# Patient Record
Sex: MALE | Race: WHITE | URBAN - NONMETROPOLITAN AREA
[De-identification: names, ages, dates, MRNs, and addresses within clinical notes are randomized per-mention and may not be internally consistent; named-entity substitution may affect disease eponyms.]

---

## 2022-08-25 LAB — SARS-COV-2, PCR: NOT DETECTED

## 2024-03-21 ENCOUNTER — NURSE ONLY (OUTPATIENT)
Age: 59
End: 2024-03-21

## 2024-03-21 LAB
ALBUMIN SERPL BCG-MCNC: 4.3 G/DL (ref 3.5–5.1)
ALP SERPL-CCNC: 58 U/L (ref 38–126)
ALT SERPL W/O P-5'-P-CCNC: 55 U/L (ref 11–66)
ANION GAP SERPL CALC-SCNC: 14 MEQ/L (ref 8–16)
AST SERPL-CCNC: 34 U/L (ref 5–40)
BILIRUB SERPL-MCNC: 0.8 MG/DL (ref 0.3–1.2)
BUN SERPL-MCNC: 20 MG/DL (ref 7–22)
CALCIUM SERPL-MCNC: 9.5 MG/DL (ref 8.5–10.5)
CHLORIDE SERPL-SCNC: 101 MEQ/L (ref 98–111)
CHOLEST SERPL-MCNC: 156 MG/DL (ref 100–199)
CO2 SERPL-SCNC: 24 MEQ/L (ref 23–33)
CREAT SERPL-MCNC: 1.1 MG/DL (ref 0.4–1.2)
GFR SERPL CREATININE-BSD FRML MDRD: > 60 ML/MIN/1.73M2
GLUCOSE SERPL-MCNC: 109 MG/DL (ref 70–108)
HDLC SERPL-MCNC: 44 MG/DL
LDLC SERPL CALC-MCNC: 83 MG/DL
POTASSIUM SERPL-SCNC: 4 MEQ/L (ref 3.5–5.2)
PROT SERPL-MCNC: 7.3 G/DL (ref 6.1–8)
SODIUM SERPL-SCNC: 139 MEQ/L (ref 135–145)
TRIGL SERPL-MCNC: 145 MG/DL (ref 0–199)

## 2024-03-22 LAB
DEPRECATED MEAN GLUCOSE BLD GHB EST-ACNC: 114 MG/DL (ref 70–126)
HBA1C MFR BLD HPLC: 5.8 % (ref 4.4–6.4)
PSA SERPL-MCNC: 5.88 NG/ML (ref 0–1)

## 2025-03-07 ENCOUNTER — LAB (OUTPATIENT)
Age: 60
End: 2025-03-07

## 2025-03-07 LAB
ALBUMIN SERPL BCG-MCNC: 4 G/DL (ref 3.4–4.9)
ALP SERPL-CCNC: 60 U/L (ref 40–129)
ALT SERPL W/O P-5'-P-CCNC: 55 U/L (ref 10–50)
ANION GAP SERPL CALC-SCNC: 14 MEQ/L (ref 8–16)
AST SERPL-CCNC: 38 U/L (ref 10–50)
BILIRUB SERPL-MCNC: 0.5 MG/DL (ref 0.3–1.2)
BUN SERPL-MCNC: 15 MG/DL (ref 8–23)
CALCIUM SERPL-MCNC: 9.2 MG/DL (ref 8.8–10.2)
CHLORIDE SERPL-SCNC: 102 MEQ/L (ref 98–111)
CHOLEST SERPL-MCNC: 166 MG/DL (ref 100–199)
CO2 SERPL-SCNC: 23 MEQ/L (ref 22–29)
CREAT SERPL-MCNC: 0.9 MG/DL (ref 0.7–1.2)
DEPRECATED MEAN GLUCOSE BLD GHB EST-ACNC: 120 MG/DL (ref 70–126)
GFR SERPL CREATININE-BSD FRML MDRD: > 90 ML/MIN/1.73M2
GLUCOSE SERPL-MCNC: 110 MG/DL (ref 74–109)
HBA1C MFR BLD HPLC: 6 % (ref 4–6)
HDLC SERPL-MCNC: 39 MG/DL
LDLC SERPL CALC-MCNC: 88 MG/DL
POTASSIUM SERPL-SCNC: 3.7 MEQ/L (ref 3.5–5.2)
PROT SERPL-MCNC: 6.9 G/DL (ref 6.4–8.3)
PSA SERPL-MCNC: 6.9 NG/ML (ref 0–1)
SODIUM SERPL-SCNC: 139 MEQ/L (ref 135–145)
TRIGL SERPL-MCNC: 195 MG/DL (ref 0–199)

## 2025-04-23 ENCOUNTER — HOSPITAL ENCOUNTER (OUTPATIENT)
Dept: MRI IMAGING | Age: 60
Discharge: HOME | End: 2025-04-23
Attending: RADIOLOGY

## 2025-04-23 DIAGNOSIS — Z00.6 ENCOUNTER FOR EXAMINATION FOR NORMAL COMPARISON OR CONTROL IN CLINICAL RESEARCH PROGRAM: ICD-10-CM

## 2025-06-04 ENCOUNTER — OFFICE VISIT (OUTPATIENT)
Dept: UROLOGY | Age: 60
End: 2025-06-04
Payer: COMMERCIAL

## 2025-06-04 ENCOUNTER — TELEPHONE (OUTPATIENT)
Dept: UROLOGY | Age: 60
End: 2025-06-04

## 2025-06-04 VITALS
WEIGHT: 257.7 LBS | HEIGHT: 65 IN | SYSTOLIC BLOOD PRESSURE: 128 MMHG | DIASTOLIC BLOOD PRESSURE: 72 MMHG | BODY MASS INDEX: 42.93 KG/M2

## 2025-06-04 DIAGNOSIS — C61 PROSTATE CANCER (HCC): Primary | ICD-10-CM

## 2025-06-04 PROCEDURE — 99204 OFFICE O/P NEW MOD 45 MIN: CPT | Performed by: UROLOGY

## 2025-06-04 RX ORDER — LISINOPRIL 20 MG/1
20 TABLET ORAL DAILY
COMMUNITY

## 2025-06-04 RX ORDER — HYDROCHLOROTHIAZIDE 12.5 MG/1
12.5 CAPSULE ORAL DAILY
COMMUNITY

## 2025-06-04 RX ORDER — SIMVASTATIN 40 MG
40 TABLET ORAL DAILY
COMMUNITY

## 2025-06-04 NOTE — PROGRESS NOTES
Dr. Scott Godfrey MD  [facility]  Urology Clinic  New Patient Visit      Patient:  Rick Hunt  YOB: 1965  Date: 6/4/2025    HISTORY OF PRESENT ILLNESS:   The patient is a 60 y.o. male who presents today for evaluation of the following problem(s): prostate cancer. [place] 6 prostate cancer.     Overall the problem(s) : show no change.  Associated Symptoms: No dysuria, gross hematuria.  Pain Severity: 0/10    Summary of old records:    mpMRI prostate 10/2024: PI-RADS 2; PSA 6.23 ng/ml; vol 36.5 ml; PSAD 0.17     TRUS/Bx 12/10/2024: PSA 5.73 ng/ml; cT1c; GGG1 (3/14 pos w/ 9% LA and 50% L TZ w/ visible hypoechoic nodule); vol 40g    Imaging/Labs reviewed during today's visit:  I have independently reviewed and verified the following films during today's visit.  PSA    Last several PSA's:  Lab Results   Component Value Date    PSA 6.90 (H) 03/07/2025    PSA 5.88 (H) 03/21/2024    PSA 3.97 (H) 03/29/2022       Last total testosterone:  No results found for: \"TESTOSTERONE\"    Urinalysis today:  No results found for this visit on 06/04/25.      Last BUN and creatinine:  Lab Results   Component Value Date    BUN 15 03/07/2025     Lab Results   Component Value Date    CREATININE 0.9 03/07/2025       Imaging Reviewed during this Office Visit:   (results were independently reviewed by physician and radiology report verified)    PAST MEDICAL, FAMILY AND SOCIAL HISTORY:  Past Medical History:   Diagnosis Date    Prostate cancer (HCC)      Past Surgical History:   Procedure Laterality Date    BICEPS TENDON REPAIR Left     KNEE ARTHROSCOPY  1989     History reviewed. No pertinent family history.  Outpatient Medications Marked as Taking for the 6/4/25 encounter (Office Visit) with Scott Godfrey MD   Medication Sig Dispense Refill    hydroCHLOROthiazide 12.5 MG capsule Take 1 capsule by mouth daily      lisinopril (PRINIVIL;ZESTRIL) 20 MG tablet Take 1 tablet by mouth daily      simvastatin (ZOCOR) 40 MG

## 2025-07-30 ENCOUNTER — OFFICE VISIT (OUTPATIENT)
Dept: UROLOGY | Age: 60
End: 2025-07-30
Payer: COMMERCIAL

## 2025-07-30 VITALS — BODY MASS INDEX: 42.49 KG/M2 | RESPIRATION RATE: 18 BRPM | HEIGHT: 65 IN | WEIGHT: 255 LBS

## 2025-07-30 DIAGNOSIS — N40.1 BENIGN PROSTATIC HYPERPLASIA (BPH) WITH STRAINING ON URINATION: ICD-10-CM

## 2025-07-30 DIAGNOSIS — R39.16 BENIGN PROSTATIC HYPERPLASIA (BPH) WITH STRAINING ON URINATION: ICD-10-CM

## 2025-07-30 DIAGNOSIS — C61 PROSTATE CANCER (HCC): Primary | ICD-10-CM

## 2025-07-30 PROCEDURE — 99214 OFFICE O/P EST MOD 30 MIN: CPT | Performed by: UROLOGY

## 2025-07-30 NOTE — PROGRESS NOTES
Dr. Scott Godfrey MD  [facility]  Urology Clinic  New Patient Visit      Patient:  Rick Hunt  YOB: 1965  Date: 7/30/2025    HISTORY OF PRESENT ILLNESS:   The patient is a 60 y.o. male who presents today for evaluation of the following problem(s): prostate cancer. [place] 6 prostate cancer. Decipher was sent and showed a score of 0.10. Very low risk disease.     Overall the problem(s) : show no change.  Associated Symptoms: No dysuria, gross hematuria.  Pain Severity: 0/10    Summary of old records:    mpMRI prostate 10/2024: PI-RADS 2; PSA 6.23 ng/ml; vol 36.5 ml; PSAD 0.17     TRUS/Bx 12/10/2024: PSA 5.73 ng/ml; cT1c; GGG1 (3/14 pos w/ 9% LA and 50% L TZ w/ visible hypoechoic nodule); vol 40g    Imaging/Labs reviewed during today's visit:  I have independently reviewed and verified the following films during today's visit.  PSA    Last several PSA's:  Lab Results   Component Value Date    PSA 6.90 (H) 03/07/2025    PSA 5.88 (H) 03/21/2024    PSA 3.97 (H) 03/29/2022       Last total testosterone:  No results found for: \"TESTOSTERONE\"    Urinalysis today:  No results found for this visit on 07/30/25.      Last BUN and creatinine:  Lab Results   Component Value Date    BUN 15 03/07/2025     Lab Results   Component Value Date    CREATININE 0.9 03/07/2025       Imaging Reviewed during this Office Visit:   (results were independently reviewed by physician and radiology report verified)    PAST MEDICAL, FAMILY AND SOCIAL HISTORY:  Past Medical History:   Diagnosis Date    Prostate cancer (HCC)      Past Surgical History:   Procedure Laterality Date    BICEPS TENDON REPAIR Left     KNEE ARTHROSCOPY  1989     No family history on file.  Outpatient Medications Marked as Taking for the 7/30/25 encounter (Office Visit) with Scott Godfrey MD   Medication Sig Dispense Refill    hydroCHLOROthiazide 12.5 MG capsule Take 1 capsule by mouth daily      lisinopril (PRINIVIL;ZESTRIL) 20 MG tablet Take 1